# Patient Record
Sex: FEMALE | URBAN - METROPOLITAN AREA
[De-identification: names, ages, dates, MRNs, and addresses within clinical notes are randomized per-mention and may not be internally consistent; named-entity substitution may affect disease eponyms.]

---

## 2022-06-22 ENCOUNTER — APPOINTMENT (RX ONLY)
Dept: URBAN - METROPOLITAN AREA CLINIC 312 | Facility: CLINIC | Age: 67
Setting detail: DERMATOLOGY
End: 2022-06-22

## 2022-06-22 DIAGNOSIS — L57.0 ACTINIC KERATOSIS: ICD-10-CM

## 2022-06-22 DIAGNOSIS — Z86.007 PERSONAL HISTORY OF IN-SITU NEOPLASM OF SKIN: ICD-10-CM

## 2022-06-22 DIAGNOSIS — L81.4 OTHER MELANIN HYPERPIGMENTATION: ICD-10-CM

## 2022-06-22 PROCEDURE — ? LIQUID NITROGEN

## 2022-06-22 PROCEDURE — 99202 OFFICE O/P NEW SF 15 MIN: CPT | Mod: 25

## 2022-06-22 PROCEDURE — ? COUNSELING

## 2022-06-22 PROCEDURE — 17000 DESTRUCT PREMALG LESION: CPT

## 2022-06-22 ASSESSMENT — LOCATION ZONE DERM
LOCATION ZONE: ARM
LOCATION ZONE: FACE
LOCATION ZONE: NOSE

## 2022-06-22 ASSESSMENT — LOCATION DETAILED DESCRIPTION DERM
LOCATION DETAILED: LEFT ANTERIOR PROXIMAL UPPER ARM
LOCATION DETAILED: NASAL DORSUM
LOCATION DETAILED: RIGHT MEDIAL MALAR CHEEK

## 2022-06-22 ASSESSMENT — LOCATION SIMPLE DESCRIPTION DERM
LOCATION SIMPLE: RIGHT CHEEK
LOCATION SIMPLE: LEFT UPPER ARM
LOCATION SIMPLE: NOSE

## 2022-06-22 NOTE — PROCEDURE: COUNSELING
Detail Level: Zone
Detail Level: Simple
Patient Specific Counseling (Will Not Stick From Patient To Patient): SAMPLES OF SUNSCREENS

## 2022-06-22 NOTE — PROCEDURE: LIQUID NITROGEN
Show Aperture Variable?: Yes
Detail Level: Detailed
Post-Care Instructions: I reviewed with the patient in detail post-care instructions. Patient is to wear sunprotection, and avoid picking at any of the treated lesions. Pt may apply Vaseline to crusted or scabbing areas. Pt is aware of the potential risk of scarring and/or dyspigmentation
Render Note In Bullet Format When Appropriate: No
Duration Of Freeze Thaw-Cycle (Seconds): 3
Consent: The patient's consent was obtained including but not limited to risks of crusting, scabbing, blistering, scarring, darker or lighter pigmentary change, recurrence, incomplete removal and infection.
Number Of Freeze-Thaw Cycles: 1 freeze-thaw cycle

## 2022-06-22 NOTE — HPI: EVALUATION OF SKIN LESION(S)
What Type Of Note Output Would You Prefer (Optional)?: Standard Output
Hpi Title: Evaluation of Skin Lesions
How Severe Are Your Spot(S)?: moderate
Have Your Spot(S) Been Treated In The Past?: has not been treated
Additional History: Ot presents for evaluation of lesion on the face x 3 weeks

## 2022-12-27 ENCOUNTER — APPOINTMENT (RX ONLY)
Dept: URBAN - METROPOLITAN AREA CLINIC 312 | Facility: CLINIC | Age: 67
Setting detail: DERMATOLOGY
End: 2022-12-27

## 2022-12-27 DIAGNOSIS — L57.0 ACTINIC KERATOSIS: ICD-10-CM | Status: INADEQUATELY CONTROLLED

## 2022-12-27 DIAGNOSIS — Z86.007 PERSONAL HISTORY OF IN-SITU NEOPLASM OF SKIN: ICD-10-CM

## 2022-12-27 DIAGNOSIS — R20.2 PARESTHESIA OF SKIN: ICD-10-CM

## 2022-12-27 DIAGNOSIS — L82.1 OTHER SEBORRHEIC KERATOSIS: ICD-10-CM

## 2022-12-27 DIAGNOSIS — D22 MELANOCYTIC NEVI: ICD-10-CM

## 2022-12-27 DIAGNOSIS — D18.0 HEMANGIOMA: ICD-10-CM

## 2022-12-27 DIAGNOSIS — L81.4 OTHER MELANIN HYPERPIGMENTATION: ICD-10-CM

## 2022-12-27 PROBLEM — D22.5 MELANOCYTIC NEVI OF TRUNK: Status: ACTIVE | Noted: 2022-12-27

## 2022-12-27 PROBLEM — D18.01 HEMANGIOMA OF SKIN AND SUBCUTANEOUS TISSUE: Status: ACTIVE | Noted: 2022-12-27

## 2022-12-27 PROCEDURE — ? COUNSELING

## 2022-12-27 PROCEDURE — ? PRESCRIPTION

## 2022-12-27 PROCEDURE — ? PRESCRIPTION MEDICATION MANAGEMENT

## 2022-12-27 PROCEDURE — ? TREATMENT REGIMEN

## 2022-12-27 PROCEDURE — 99214 OFFICE O/P EST MOD 30 MIN: CPT

## 2022-12-27 PROCEDURE — ? MEDICATION COUNSELING

## 2022-12-27 PROCEDURE — ? FULL BODY SKIN EXAM

## 2022-12-27 PROCEDURE — ? ADDITIONAL NOTES

## 2022-12-27 RX ORDER — FLUOROURACIL 2 G/40G
CREAM TOPICAL QHS
Qty: 40 | Refills: 3 | Status: ERX | COMMUNITY
Start: 2022-12-27

## 2022-12-27 RX ADMIN — FLUOROURACIL: 2 CREAM TOPICAL at 00:00

## 2022-12-27 ASSESSMENT — LOCATION DETAILED DESCRIPTION DERM
LOCATION DETAILED: NASAL DORSUM
LOCATION DETAILED: LEFT MEDIAL UPPER BACK
LOCATION DETAILED: INFERIOR THORACIC SPINE
LOCATION DETAILED: LEFT DISTAL DORSAL FOREARM
LOCATION DETAILED: RIGHT DISTAL DORSAL FOREARM
LOCATION DETAILED: RIGHT SUPERIOR MEDIAL MIDBACK
LOCATION DETAILED: LEFT CENTRAL MALAR CHEEK

## 2022-12-27 ASSESSMENT — LOCATION ZONE DERM
LOCATION ZONE: NOSE
LOCATION ZONE: ARM
LOCATION ZONE: FACE
LOCATION ZONE: TRUNK

## 2022-12-27 ASSESSMENT — LOCATION SIMPLE DESCRIPTION DERM
LOCATION SIMPLE: RIGHT FOREARM
LOCATION SIMPLE: RIGHT LOWER BACK
LOCATION SIMPLE: LEFT FOREARM
LOCATION SIMPLE: LEFT CHEEK
LOCATION SIMPLE: NOSE
LOCATION SIMPLE: UPPER BACK
LOCATION SIMPLE: LEFT UPPER BACK

## 2022-12-27 NOTE — PROCEDURE: PRESCRIPTION MEDICATION MANAGEMENT
Render In Strict Bullet Format?: No
Initiate Treatment: Pt will tx DIFFUSE AK DAMAGE of the  FACE with efudex applied qHS x 2wks.  Please call if excessive rxn develops including crusting, pain, bleeding, etc.  Again reviewed tx course, mechanism of action and what to expect with tx.  Avoid sun exposure.  Do not consume rx.  It is toxic to pets.  Avoid exposure to Pg or breastfeeding women.
Detail Level: Detailed

## 2022-12-27 NOTE — PROCEDURE: MEDICATION COUNSELING
Xeldaniez Pregnancy And Lactation Text: This medication is Pregnancy Category D and is not considered safe during pregnancy.  The risk during breast feeding is also uncertain.

## 2022-12-27 NOTE — HPI: EVALUATION OF SKIN LESION(S)
What Type Of Note Output Would You Prefer (Optional)?: Standard Output
Hpi Title: Evaluation of Skin Lesions
How Severe Are Your Spot(S)?: moderate
Have Your Spot(S) Been Treated In The Past?: has not been treated
Additional History: Patient is here for fbse.  She has a history of ssci.  She has previously used efudex.  \\n\\n1) she has a dry, itchy spot under her left eye, that has been treated previously with efudex, but has reappeared.\\n\\n1) various dry, itchy spots on her back

## 2024-01-09 ENCOUNTER — APPOINTMENT (RX ONLY)
Dept: URBAN - METROPOLITAN AREA CLINIC 312 | Facility: CLINIC | Age: 69
Setting detail: DERMATOLOGY
End: 2024-01-09

## 2024-01-09 DIAGNOSIS — L82.1 OTHER SEBORRHEIC KERATOSIS: ICD-10-CM

## 2024-01-09 DIAGNOSIS — D22 MELANOCYTIC NEVI: ICD-10-CM

## 2024-01-09 DIAGNOSIS — Z86.007 PERSONAL HISTORY OF IN-SITU NEOPLASM OF SKIN: ICD-10-CM

## 2024-01-09 DIAGNOSIS — L81.4 OTHER MELANIN HYPERPIGMENTATION: ICD-10-CM

## 2024-01-09 DIAGNOSIS — D18.0 HEMANGIOMA: ICD-10-CM

## 2024-01-09 PROBLEM — D22.5 MELANOCYTIC NEVI OF TRUNK: Status: ACTIVE | Noted: 2024-01-09

## 2024-01-09 PROBLEM — D18.01 HEMANGIOMA OF SKIN AND SUBCUTANEOUS TISSUE: Status: ACTIVE | Noted: 2024-01-09

## 2024-01-09 PROCEDURE — ? ADDITIONAL NOTES

## 2024-01-09 PROCEDURE — ? COUNSELING

## 2024-01-09 PROCEDURE — ? FULL BODY SKIN EXAM

## 2024-01-09 PROCEDURE — ? TREATMENT REGIMEN

## 2024-01-09 PROCEDURE — 99213 OFFICE O/P EST LOW 20 MIN: CPT

## 2024-01-09 ASSESSMENT — LOCATION ZONE DERM
LOCATION ZONE: NOSE
LOCATION ZONE: TRUNK
LOCATION ZONE: ARM

## 2024-01-09 ASSESSMENT — LOCATION SIMPLE DESCRIPTION DERM
LOCATION SIMPLE: UPPER BACK
LOCATION SIMPLE: NOSE
LOCATION SIMPLE: RIGHT LOWER BACK
LOCATION SIMPLE: LEFT FOREARM
LOCATION SIMPLE: RIGHT FOREARM

## 2024-01-09 ASSESSMENT — LOCATION DETAILED DESCRIPTION DERM
LOCATION DETAILED: LEFT DISTAL DORSAL FOREARM
LOCATION DETAILED: RIGHT SUPERIOR MEDIAL MIDBACK
LOCATION DETAILED: NASAL DORSUM
LOCATION DETAILED: INFERIOR THORACIC SPINE
LOCATION DETAILED: RIGHT DISTAL DORSAL FOREARM

## 2025-01-16 ENCOUNTER — APPOINTMENT (OUTPATIENT)
Dept: URBAN - METROPOLITAN AREA CLINIC 312 | Facility: CLINIC | Age: 70
Setting detail: DERMATOLOGY
End: 2025-01-16

## 2025-01-16 DIAGNOSIS — D18.0 HEMANGIOMA: ICD-10-CM

## 2025-01-16 DIAGNOSIS — L81.4 OTHER MELANIN HYPERPIGMENTATION: ICD-10-CM

## 2025-01-16 DIAGNOSIS — D22 MELANOCYTIC NEVI: ICD-10-CM

## 2025-01-16 DIAGNOSIS — S90.1 CONTUSION OF TOE WITHOUT DAMAGE TO NAIL: ICD-10-CM

## 2025-01-16 DIAGNOSIS — L82.1 OTHER SEBORRHEIC KERATOSIS: ICD-10-CM

## 2025-01-16 DIAGNOSIS — Z86.007 PERSONAL HISTORY OF IN-SITU NEOPLASM OF SKIN: ICD-10-CM

## 2025-01-16 PROBLEM — D22.5 MELANOCYTIC NEVI OF TRUNK: Status: ACTIVE | Noted: 2025-01-16

## 2025-01-16 PROBLEM — S90.111A CONTUSION OF RIGHT GREAT TOE WITHOUT DAMAGE TO NAIL, INITIAL ENCOUNTER: Status: ACTIVE | Noted: 2025-01-16

## 2025-01-16 PROBLEM — D18.01 HEMANGIOMA OF SKIN AND SUBCUTANEOUS TISSUE: Status: ACTIVE | Noted: 2025-01-16

## 2025-01-16 PROCEDURE — ? FULL BODY SKIN EXAM

## 2025-01-16 PROCEDURE — ? ADDITIONAL NOTES

## 2025-01-16 PROCEDURE — ? COUNSELING

## 2025-01-16 PROCEDURE — 99213 OFFICE O/P EST LOW 20 MIN: CPT

## 2025-01-16 PROCEDURE — ? TREATMENT REGIMEN

## 2025-01-16 ASSESSMENT — LOCATION SIMPLE DESCRIPTION DERM
LOCATION SIMPLE: LEFT FOREARM
LOCATION SIMPLE: RIGHT LOWER BACK
LOCATION SIMPLE: RIGHT FOREARM
LOCATION SIMPLE: UPPER BACK
LOCATION SIMPLE: RIGHT GREAT TOE
LOCATION SIMPLE: NOSE

## 2025-01-16 ASSESSMENT — LOCATION ZONE DERM
LOCATION ZONE: ARM
LOCATION ZONE: TRUNK
LOCATION ZONE: TOENAIL
LOCATION ZONE: NOSE

## 2025-01-16 ASSESSMENT — LOCATION DETAILED DESCRIPTION DERM
LOCATION DETAILED: RIGHT GREAT TOENAIL
LOCATION DETAILED: NASAL DORSUM
LOCATION DETAILED: INFERIOR THORACIC SPINE
LOCATION DETAILED: LEFT DISTAL DORSAL FOREARM
LOCATION DETAILED: RIGHT SUPERIOR MEDIAL MIDBACK
LOCATION DETAILED: RIGHT DISTAL DORSAL FOREARM

## 2025-01-16 NOTE — PROCEDURE: COUNSELING
Detail Level: Generalized
Detail Level: Zone
Detail Level: Simple
Patient Specific Counseling (Will Not Stick From Patient To Patient): OCCURRED IN AUGUST AFTER DROPPING A PIANO LEG ON TOE.  IT IS GROWING OUT.
Detail Level: Detailed

## 2025-01-16 NOTE — HPI: EVALUATION OF SKIN LESION(S)
12 lead EKG performed. Normal sinus rhythm. MD notified. No new orders  
DME ORDER SENT TO [facility] VIA RIGHTFAX ON 12/22/23  
What Type Of Note Output Would You Prefer (Optional)?: Bullet Format
Hpi Title: Evaluation of Skin Lesions
Additional History: Pt is not overly concerned with any spots today.